# Patient Record
Sex: FEMALE | Race: WHITE | NOT HISPANIC OR LATINO | Employment: STUDENT | URBAN - METROPOLITAN AREA
[De-identification: names, ages, dates, MRNs, and addresses within clinical notes are randomized per-mention and may not be internally consistent; named-entity substitution may affect disease eponyms.]

---
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
## 2018-01-12 VITALS
SYSTOLIC BLOOD PRESSURE: 111 MMHG | WEIGHT: 111.5 LBS | HEART RATE: 57 BPM | BODY MASS INDEX: 20.39 KG/M2 | DIASTOLIC BLOOD PRESSURE: 69 MMHG

## 2018-01-15 VITALS
BODY MASS INDEX: 20.66 KG/M2 | HEART RATE: 68 BPM | DIASTOLIC BLOOD PRESSURE: 68 MMHG | SYSTOLIC BLOOD PRESSURE: 107 MMHG | HEIGHT: 62 IN | WEIGHT: 112.25 LBS

## 2018-05-10 ENCOUNTER — HOSPITAL ENCOUNTER (EMERGENCY)
Facility: HOSPITAL | Age: 18
Discharge: HOME | End: 2018-05-10
Admitting: EMERGENCY MEDICINE
Payer: COMMERCIAL

## 2018-05-10 VITALS
RESPIRATION RATE: 18 BRPM | TEMPERATURE: 98.7 F | DIASTOLIC BLOOD PRESSURE: 69 MMHG | HEART RATE: 84 BPM | OXYGEN SATURATION: 100 % | SYSTOLIC BLOOD PRESSURE: 114 MMHG | BODY MASS INDEX: 21.18 KG/M2 | WEIGHT: 115.8 LBS

## 2018-05-10 DIAGNOSIS — T78.40XA ALLERGIC REACTION, INITIAL ENCOUNTER: Primary | ICD-10-CM

## 2018-05-10 PROCEDURE — 96375 TX/PRO/DX INJ NEW DRUG ADDON: CPT

## 2018-05-10 PROCEDURE — 96374 THER/PROPH/DIAG INJ IV PUSH: CPT

## 2018-05-10 PROCEDURE — 99283 EMERGENCY DEPT VISIT LOW MDM: CPT

## 2018-05-10 PROCEDURE — 96372 THER/PROPH/DIAG INJ SC/IM: CPT

## 2018-05-10 RX ORDER — ONDANSETRON 2 MG/ML
4 INJECTION INTRAMUSCULAR; INTRAVENOUS ONCE
Status: COMPLETED | OUTPATIENT
Start: 2018-05-10 | End: 2018-05-10

## 2018-05-10 RX ORDER — METHYLPREDNISOLONE SODIUM SUCCINATE 125 MG/2ML
125 INJECTION, POWDER, LYOPHILIZED, FOR SOLUTION INTRAMUSCULAR; INTRAVENOUS ONCE
Status: COMPLETED | OUTPATIENT
Start: 2018-05-10 | End: 2018-05-10

## 2018-05-10 RX ORDER — DIPHENHYDRAMINE HYDROCHLORIDE 50 MG/ML
25 INJECTION INTRAMUSCULAR; INTRAVENOUS ONCE
Status: COMPLETED | OUTPATIENT
Start: 2018-05-10 | End: 2018-05-10

## 2018-05-10 RX ORDER — PREDNISONE 50 MG/1
50 TABLET ORAL DAILY
Qty: 4 TABLET | Refills: 0 | Status: SHIPPED | OUTPATIENT
Start: 2018-05-10 | End: 2018-05-14

## 2018-05-10 RX ORDER — CETIRIZINE HYDROCHLORIDE 10 MG/1
10 TABLET ORAL DAILY
Qty: 4 TABLET | Refills: 0 | Status: SHIPPED | OUTPATIENT
Start: 2018-05-10 | End: 2019-01-07 | Stop reason: CLARIF

## 2018-05-10 RX ORDER — EPINEPHRINE 1 MG/ML
0.3 INJECTION, SOLUTION, CONCENTRATE INTRAVENOUS ONCE
Status: COMPLETED | OUTPATIENT
Start: 2018-05-10 | End: 2018-05-10

## 2018-05-10 RX ADMIN — ONDANSETRON 4 MG: 2 INJECTION INTRAMUSCULAR; INTRAVENOUS at 18:16

## 2018-05-10 RX ADMIN — DIPHENHYDRAMINE HYDROCHLORIDE 25 MG: 50 INJECTION, SOLUTION INTRAMUSCULAR; INTRAVENOUS at 18:03

## 2018-05-10 RX ADMIN — METHYLPREDNISOLONE SODIUM SUCCINATE 125 MG: 125 INJECTION, POWDER, FOR SOLUTION INTRAMUSCULAR; INTRAVENOUS at 17:59

## 2018-05-10 RX ADMIN — EPINEPHRINE 0.3 MG: 1 INJECTION, SOLUTION, CONCENTRATE INTRAVENOUS at 17:54

## 2018-05-10 NOTE — ED PROVIDER NOTES
History  Chief Complaint   Patient presents with    Allergic Reaction     patient reports having 30 minutes ago peanut butter crackers x4  without knowing they were peanut butter and has known allergy  took 1 benadryl tablet upon arrival  airway patent at this time  reports "itchy throat and swollen mouth"  noted red face/rash  This [age]-year-old white female presents emergency room after accidentally eating crackers that have peanut butter in it she has a known peanut allergy  She does carry an EpiPen but has never used it  She did take 25 mg of Benadryl at home  She complains of facial flushing and hives  She also complains of a swelling of her oral mucosa as well as her throat feeling funny and sore when she swallows  She denies any respiratory difficulty  She denies any shortness of breath or wheezing or coughing  She does complain of nausea but no active vomiting  She denies abdominal pain or chest pain  Past medical history is negative  History provided by:  Patient  Allergic Reaction   Presenting symptoms: difficulty swallowing, itching, rash and swelling    Presenting symptoms: no difficulty breathing and no wheezing    Severity:  Moderate  Duration:  1 hour  Prior allergic episodes:  Food/nut allergies  Context: nuts    Context: not animal exposure, not chemicals, not cosmetics, not dairy/milk products, not eggs, not food allergies, not grass, not insect bite/sting, not jewelry/metal, not medications, not new detergents/soaps and not poison ivy    Relieved by:  Nothing  Ineffective treatments:  Antihistamines      None       History reviewed  No pertinent past medical history  History reviewed  No pertinent surgical history  History reviewed  No pertinent family history  I have reviewed and agree with the history as documented      Social History   Substance Use Topics    Smoking status: Never Smoker    Smokeless tobacco: Never Used    Alcohol use No        Review of Systems   Constitutional: Positive for appetite change and chills  Negative for activity change, diaphoresis and fatigue  HENT: Positive for facial swelling, sore throat and trouble swallowing  Negative for congestion, drooling, postnasal drip, rhinorrhea and sneezing  Eyes: Negative for pain, discharge, redness and itching  Respiratory: Negative for cough, chest tightness, shortness of breath and wheezing  Cardiovascular: Negative for chest pain, palpitations and leg swelling  Gastrointestinal: Positive for nausea  Negative for abdominal pain and vomiting  Endocrine: Negative for cold intolerance, heat intolerance, polydipsia, polyphagia and polyuria  Genitourinary: Negative for difficulty urinating, frequency, hematuria and urgency  Skin: Positive for color change, itching and rash  Hematological: Negative for adenopathy  Does not bruise/bleed easily  Psychiatric/Behavioral: Negative for confusion  All other systems reviewed and are negative  Physical Exam  ED Triage Vitals   Temperature Pulse Respirations Blood Pressure SpO2   05/10/18 1727 05/10/18 1726 05/10/18 1726 05/10/18 1726 05/10/18 1726   98 7 °F (37 1 °C) 93 18 114/69 100 %      Temp Source Heart Rate Source Patient Position - Orthostatic VS BP Location FiO2 (%)   05/10/18 1727 05/10/18 1726 05/10/18 1726 05/10/18 1726 --   Oral Monitor Sitting Left arm       Pain Score       05/10/18 1726       7           Orthostatic Vital Signs  Vitals:    05/10/18 1815 05/10/18 1830 05/10/18 1845 05/10/18 1900   BP:       Pulse: 78 88 88 84   Patient Position - Orthostatic VS:           Physical Exam   Constitutional: She is oriented to person, place, and time  She appears well-developed and well-nourished  No distress  HENT:   Head: Normocephalic     Right Ear: External ear normal    Left Ear: External ear normal    Nose: Nose normal    Mouth/Throat: Oropharynx is clear and moist    Eyes: Conjunctivae are normal  Right eye exhibits no discharge  Left eye exhibits no discharge  Neck: Neck supple  Cardiovascular: Normal rate, regular rhythm and normal heart sounds  Exam reveals no gallop and no friction rub  No murmur heard  Pulmonary/Chest: Effort normal and breath sounds normal  No respiratory distress  She has no wheezes  She has no rales  Abdominal: Soft  She exhibits no distension  There is no tenderness  There is no rebound and no guarding  Musculoskeletal: She exhibits no edema  Lymphadenopathy:     She has no cervical adenopathy  Neurological: She is alert and oriented to person, place, and time  Skin: Capillary refill takes less than 2 seconds  Rash noted  She is not diaphoretic  There is erythema  Facial flushing, scattered urticaria  Psychiatric: She has a normal mood and affect  Her behavior is normal  Judgment and thought content normal    Nursing note and vitals reviewed  ED Medications  Medications   EPINEPHrine PF (ADRENALIN) 1 mg/mL injection 0 3 mg (0 3 mg Intramuscular Given 5/10/18 1754)   methylPREDNISolone sodium succinate (Solu-MEDROL) injection 125 mg (125 mg Intravenous Given 5/10/18 1759)   diphenhydrAMINE (BENADRYL) injection 25 mg (25 mg Intravenous Given 5/10/18 1803)   ondansetron (ZOFRAN) injection 4 mg (4 mg Intravenous Given 5/10/18 1816)       Diagnostic Studies  Results Reviewed     None                 No orders to display              Procedures  Procedures       Phone Contacts  ED Phone Contact    ED Course  ED Course as of May 10 2322   Thu May 10, 2018   1810 Fidelina 73 is feeling better after the epinephrine injection, Benadryl and Solumedrol  Will observe for one hour and then release home with her Father  1900 Patient is asymptomatic  Plan discharge to home                                  MDM  Number of Diagnoses or Management Options  Allergic reaction, initial encounter: new and does not require workup  Risk of Complications, Morbidity, and/or Mortality  Presenting problems: high  Diagnostic procedures: high  Management options: high  General comments: Patient presents to the emergency room after accidentally eating crackers that contained peanuts  She complained of facial swelling and swelling within her oral mucosa  She was complaining of difficulty swallowing  She denied any airway issues  She was not having any difficulty breathing  She was seen and evaluated  She was given a dose of epinephrine as well as IV Solu-Medrol and Benadryl  She was observed for 1 5  hours  She was feeling better at discharge  She was instructed to return if her symptoms worsen  She was given prescriptions for prednisone to continue for 4 more days as well as Zyrtec and Benadryl for breakthrough itching  I instructed her to use her EpiPen at home the next time she has an exposure to peanuts  Patient Progress  Patient progress: stable    CritCare Time    Disposition  Final diagnoses: Allergic reaction, initial encounter     Time reflects when diagnosis was documented in both MDM as applicable and the Disposition within this note     Time User Action Codes Description Comment    5/10/2018  7:02 PM Esmer Heller [[id number]] Allergic reaction, initial encounter       ED Disposition     ED Disposition Condition Comment    Discharge  Jaqueline Moyer discharge to home/self care      Condition at discharge: Good        Follow-up Information     Follow up With Specialties Details Why [facility], DO Pediatrics  As needed [address] [place] [address]  [phone]          Discharge Medication List as of 5/10/2018  7:05 PM      START taking these medications    Details   cetirizine (ZyrTEC) 10 mg tablet Take 1 tablet (10 mg total) by mouth daily for 4 doses, Starting Thu 5/10/2018, Until Mon 5/14/2018, Normal      predniSONE 50 mg tablet Take 1 tablet (50 mg total) by mouth daily for 4 days Start prednisone on Friday, 05/11/2018, Starting u 5/10/2018, Until Mon 5/14/2018, Normal           No discharge procedures on file      ED Provider  Electronically Signed by           Keyur Vargas PA-C  05/10/18 5570

## 2018-05-10 NOTE — ED NOTES
PT awake and alert, no distress noted, no other questions upon d/c       April Rosa Isela Ovalle RN  05/10/18 8761

## 2018-05-10 NOTE — DISCHARGE INSTRUCTIONS
Food Allergy   WHAT YOU NEED TO KNOW:   A food allergy is an immune system reaction to a food  A food allergen is an ingredient or chemical in a food that causes your immune system to react  Allergic reactions happen when your immune system fights too strongly against an allergen and causes you to get sick  Allergic reactions can happen within minutes to several hours after you eat, touch, or smell the food  You can also have a second reaction up to 8 hours later  DISCHARGE INSTRUCTIONS:   Call 911 for signs or symptoms of anaphylaxis,  such as trouble breathing, swelling in your mouth or throat, or wheezing  You may also have itching, a rash, hives, or feel like you are going to faint  Return to the emergency department if:   · Your mouth, tongue, or throat swells  · You have itching or hives that spread all over your body  Contact your healthcare provider if:   · You have new or worsening rashes, hives, or itching  · You have an upset stomach or are vomiting  · You have stomach cramps or diarrhea  · You have questions about your treatment, medicine, or care  Medicines:   · Epinephrine  is used to treat severe allergic reactions such as anaphylaxis  · Antihistamines  decrease mild symptoms such as itching or a rash  · Steroids: This medicine may be given to decrease inflammation  · Short-acting bronchodilators: You may need short-acting bronchodilators to help open your airways quickly  These medicines may be called rescue inhalers or relievers  They relieve sudden, severe symptoms and start to work right away  · Take your medicine as directed  Contact your healthcare provider if you think your medicine is not helping or if you have side effects  Tell him or her if you are allergic to any medicine  Keep a list of the medicines, vitamins, and herbs you take  Include the amounts, and when and why you take them  Bring the list or the pill bottles to follow-up visits   Carry your medicine list with you in case of an emergency  Follow up with your healthcare provider as directed: You may need to see specialists for ongoing care  Your healthcare provider may want to test you regularly to see if the food allergy changes  Write down your questions so you remember to ask them during follow-up visits  Steps to take for signs or symptoms of anaphylaxis:   · Immediately  give 1 shot of epinephrine only into the outer thigh muscle  · Leave the shot in place  as directed  Your healthcare provider may recommend you leave it in place for up to 10 seconds before you remove it  This helps make sure all of the epinephrine is delivered  · Call 911 and go to the emergency department,  even if the shot improved symptoms  Do not drive yourself  Bring the used epinephrine shot with you  Safety precautions to take if you are at risk for anaphylaxis:   · Keep 2 shots of epinephrine with you at all times  You may need a second shot, because epinephrine only works for about 20 minutes and symptoms may return  Your healthcare provider can show you and family members how to give the shot  Check the expiration date every month and replace it before it expires  · Create an action plan  Your healthcare provider can help you create a written plan that explains the allergy and an emergency plan to treat a reaction  The plan explains when to give a second epinephrine shot if symptoms return or do not improve after the first  Give copies of the action plan and emergency instructions to family members, work and school staff, and  providers  Show them how to give a shot of epinephrine  Update the plan as the allergy changes  · Be careful when you exercise  If you have had exercise-induced anaphylaxis, do not exercise right after you eat  Stop exercising right away if you start to develop any signs or symptoms of anaphylaxis  You may first feel tired, warm, or have itchy skin   Hives, swelling, and severe breathing problems may develop if you continue to exercise  · Carry medical alert identification  Wear jewelry or carry a card that says you have a food allergy  Ask your healthcare provider where to get these items  · Do not eat the food that causes your allergy  Even a small taste can cause an allergic reaction  Your healthcare provider or a dietitian can help you plan a balanced diet  Babies may need to drink a formula that does not contain milk or soy  A dietitian can teach you how to read labels for ingredients that cause your allergies  · Ask about ingredients in foods prepared outside your home  When you eat out, ask what is in the food you want to order  Ask how food is prepared  Fried foods may contain small amounts of food allergens, such as nuts and shellfish  · Use good hygiene  Do not share utensils or food  Wash your hands before and after meals  Flu vaccine and egg allergy:  Do not get the nasal spray form of the flu vaccine if you have an egg allergy  The nasal spray may contain egg proteins that can cause anaphylaxis  Ask your healthcare provider if the injection form of the vaccine is safe for you  © 2017 [facility] Information is for End User's use only and may not be sold, redistributed or otherwise used for commercial purposes  All illustrations and images included in CareNotes® are the copyrighted property of [employer]  or Miller Cobb  The above information is an  only  It is not intended as medical advice for individual conditions or treatments  Talk to your doctor, nurse or pharmacist before following any medical regimen to see if it is safe and effective for you  General Allergic Reaction, Ambulatory Care   GENERAL INFORMATION:   A general allergic reaction  is your body's response to an allergen  Allergens include medicines, food, insect stings, animal dander, mold, latex, chemicals, and dust mites   Pollen from trees, grass, and weeds can also cause an allergic reaction  An allergic reaction can cause one or more symptoms  Seek immediate care for the following symptoms:   · A skin rash, hives, swelling, or itching that gets worse    · Trouble breathing, shortness of breath, wheezing, or coughing    · Tightened or swollen throat, or your lips or tongue swell    · Trouble swallowing or speaking    · Dizziness, lightheadedness, fainting, or confusion    · Nausea, vomiting, diarrhea, or abdominal cramps    · Chest pain or tightness  Treatment for a general allergic reaction  may include medicines to relieve certain allergy symptoms such as itching, sneezing, and swelling  You may take them as a pill or use drops in your nose or eyes  Topical treatments may be given to put directly on your skin to help decrease itching or swelling  Manage allergic reactions:   · Avoid the allergen  that you think may have caused your allergic reaction  · Use cold compresses  on your skin or eyes if they were affected by the allergic reaction  Cold compresses may help to soothe your skin or eyes  · Rinse your nasal passages  with a saline solution  Daily rinsing may help clear your nose of allergens  · Do not smoke  Your allergy symptoms may decrease if you are not around smoke  If you smoke, it is never too late to quit  Ask your healthcare provider for information about how to stop if you need help quitting  Follow up with your healthcare provider as directed:  Write down your questions so you remember to ask them during your visits  CARE AGREEMENT:   You have the right to help plan your care  Learn about your health condition and how it may be treated  Discuss treatment options with your caregivers to decide what care you want to receive  You always have the right to refuse treatment  The above information is an  only  It is not intended as medical advice for individual conditions or treatments   Talk to your doctor, nurse or pharmacist before following any medical regimen to see if it is safe and effective for you  © 2014 [address] is for End User's use only and may not be sold, redistributed or otherwise used for commercial purposes  All illustrations and images included in CareNotes® are the copyrighted property of A D A M [employer] Inc  or Miller Cobb

## 2018-11-20 ENCOUNTER — OFFICE VISIT (OUTPATIENT)
Dept: OBGYN CLINIC | Facility: CLINIC | Age: 18
End: 2018-11-20

## 2018-11-20 VITALS
HEART RATE: 70 BPM | OXYGEN SATURATION: 100 % | WEIGHT: 115.4 LBS | SYSTOLIC BLOOD PRESSURE: 116 MMHG | BODY MASS INDEX: 20.45 KG/M2 | DIASTOLIC BLOOD PRESSURE: 80 MMHG | HEIGHT: 63 IN

## 2018-11-20 DIAGNOSIS — N76.0 ACUTE VAGINITIS: Primary | ICD-10-CM

## 2018-11-20 DIAGNOSIS — D22.9 NEVUS: ICD-10-CM

## 2018-11-20 LAB — SL AMB POCT WET MOUNT: ABNORMAL

## 2018-11-20 PROCEDURE — 99203 OFFICE O/P NEW LOW 30 MIN: CPT | Performed by: NURSE PRACTITIONER

## 2018-11-20 PROCEDURE — 87210 SMEAR WET MOUNT SALINE/INK: CPT | Performed by: NURSE PRACTITIONER

## 2018-11-20 RX ORDER — FLUCONAZOLE 150 MG/1
150 TABLET ORAL ONCE
Qty: 2 TABLET | Refills: 0 | Status: SHIPPED | OUTPATIENT
Start: 2018-11-20 | End: 2018-11-20

## 2018-11-20 RX ORDER — LEVONORGESTREL AND ETHINYL ESTRADIOL 0.1-0.02MG
1 KIT ORAL DAILY
COMMUNITY

## 2018-11-20 RX ORDER — ALBUTEROL SULFATE 90 UG/1
AEROSOL, METERED RESPIRATORY (INHALATION)
COMMUNITY

## 2018-12-28 ENCOUNTER — TELEPHONE (OUTPATIENT)
Dept: OBGYN CLINIC | Facility: CLINIC | Age: 18
End: 2018-12-28

## 2019-01-07 ENCOUNTER — OFFICE VISIT (OUTPATIENT)
Dept: OBGYN CLINIC | Facility: CLINIC | Age: 19
End: 2019-01-07

## 2019-01-07 VITALS — SYSTOLIC BLOOD PRESSURE: 122 MMHG | BODY MASS INDEX: 20.53 KG/M2 | WEIGHT: 115 LBS | DIASTOLIC BLOOD PRESSURE: 60 MMHG

## 2019-01-07 DIAGNOSIS — N76.1 CHRONIC VAGINITIS: Primary | ICD-10-CM

## 2019-01-07 PROCEDURE — 87480 CANDIDA DNA DIR PROBE: CPT | Performed by: NURSE PRACTITIONER

## 2019-01-07 PROCEDURE — 99213 OFFICE O/P EST LOW 20 MIN: CPT | Performed by: NURSE PRACTITIONER

## 2019-01-07 PROCEDURE — 87510 GARDNER VAG DNA DIR PROBE: CPT | Performed by: NURSE PRACTITIONER

## 2019-01-07 PROCEDURE — 87660 TRICHOMONAS VAGIN DIR PROBE: CPT | Performed by: NURSE PRACTITIONER

## 2019-01-07 RX ORDER — AMOXICILLIN 500 MG/1
500 CAPSULE ORAL 3 TIMES DAILY
Refills: 0 | COMMUNITY
Start: 2019-01-02

## 2019-01-07 RX ORDER — INFLUENZA A VIRUS A/SINGAPORE/GP1908/2015 IVR-180A (H1N1) ANTIGEN (PROPIOLACTONE INACTIVATED), INFLUENZA A VIRUS A/HONG KONG/4801/2014 X-263B (H3N2) ANTIGEN (PROPIOLACTONE INACTIVATED), INFLUENZA B VIRUS B/BRISBANE/46/2015 ANTIGEN (PROPIOLACTONE INACTIVATED), AND INFLUENZA B VIRUS B/PHUKET/3073/2013 BVR-1B ANTIGEN (PROPIOLACTONE INACTIVATED) 15; 15; 15; 15 UG/.5ML; UG/.5ML; UG/.5ML; UG/.5ML
INJECTION, SUSPENSION INTRAMUSCULAR
Refills: 0 | COMMUNITY
Start: 2018-11-21 | End: 2019-01-07

## 2019-01-07 NOTE — PROGRESS NOTES
Assessment/Plan:    1  Chronic vaginitis  Unresponsive to fluconazole  Did not use topical -- advised to do so  Affirm taken, will treat accordingly    Subjective:      Patient ID: Anuel Mack is a 19 y o  female  PROBLEM VISIT   HPI    Continues to have vulvovaginal symptoms  She was treated for a yeast infection 11/20/18, took two fluconazole but is still with symptoms  Stinging, prickly discomfort intravaginally; when discharge comes out creates itching  She did not use the topical triamcinolone ointment  Girard teeth just extracted- started antibiotic on 1/2/19 3x daily for 10 days  Started her period today  The following portions of the patient's history were reviewed and updated as appropriate: allergies, current medications, past family history, past medical history, past social history, past surgical history and problem list     Review of Systems   Constitutional: Negative for chills and fever  Genitourinary: Positive for vaginal discharge  Negative for dyspareunia, dysuria, frequency, genital sores, hematuria, menstrual problem, pelvic pain, urgency, vaginal bleeding and vaginal pain  Recent antibiotic treatment: yes, she is on 5th day of 10 day course    Stinging, prickly; itches when discharge comes out         Objective:    /60 (BP Location: Left arm, Patient Position: Sitting)   Wt 52 2 kg (115 lb)   LMP 01/07/2018 (Exact Date)   Breastfeeding? No   BMI 20 53 kg/m²        Physical Exam   Constitutional: She appears well-developed and well-nourished  She appears distressed  HENT:   Head: Normocephalic and atraumatic  Eyes: Pupils are equal, round, and reactive to light  Pulmonary/Chest: Effort normal    Genitourinary:       There is no rash, tenderness, lesion or injury on the right labia  There is no rash, tenderness, lesion or injury on the left labia  Uterus is not enlarged and not tender   Cervix exhibits no motion tenderness, no discharge and no friability  Right adnexum displays no mass and no tenderness  Left adnexum displays no mass and no tenderness  There is bleeding (scant, menstrual) in the vagina  No erythema or tenderness in the vagina  No foreign body in the vagina  No signs of injury around the vagina  No vaginal discharge found  Lymphadenopathy:        Right: No inguinal adenopathy present  Left: No inguinal adenopathy present  Neurological: She is alert  Psychiatric: She has a normal mood and affect   Her behavior is normal  Thought content normal

## 2019-01-09 LAB
CANDIDA RRNA VAG QL PROBE: NEGATIVE
G VAGINALIS RRNA GENITAL QL PROBE: NEGATIVE
T VAGINALIS RRNA GENITAL QL PROBE: NEGATIVE

## 2019-01-14 ENCOUNTER — TELEPHONE (OUTPATIENT)
Dept: OBGYN CLINIC | Facility: CLINIC | Age: 19
End: 2019-01-14

## 2022-04-01 NOTE — PROGRESS NOTES
Assessment/Plan:    1  Acute vaginitis  Wet mount positive for buds  rx sent for fluconazole and external triamcinolone  Discussed preventative measures  - fluconazole (DIFLUCAN) 150 mg tablet; Take 1 tablet (150 mg total) by mouth once for 1 dose Re[eat 2nd dose in 3 days if needed  Dispense: 2 tablet; Refill: 0  - triamcinolone (KENALOG) 0 1 % ointment; Apply topically 2 (two) times a day for 7 days  Dispense: 30 g; Refill: 0  - POCT wet mount    2  Nevus  Dark nevus with irregular borders left labia majorum  Recommend removal with Dr Anay Taylor  Patient agrees  I have spent 30 minutes with Patient  today in which greater than 50% of this time was spent in counseling/coordination of care regarding Diagnostic results, Intructions for management, Patient and family education and Impressions  Subjective:      Patient ID: Damien Cobb is a 19 y o  female  HPI  NEW PATIENT/ PROBLEM  CC: vaginitis    Patient presents with complaint of what she believes to be reoccurring vaginal yeast infections  States that over the summer she had a "typical yeast infection"  She used antibiotic cream externally and monistat with effect  Upon return to college in September she developed symptoms again-- discomfort, itching, clumpy discharge  She used monistat again which resolved the symptoms  Symptoms reoccurred in early October and early this month  Used monistat again, but this time it did not resolve her symptoms  Now very intense sudden itching, stinging  No increase in VD, no foul odor  Was having some BTB-- started [facility] 9/27  No pelvic pain or urinary symptoms      Received gardasil vaccine  BC: OCP-- just started end of September  Monogamous, + condom use  Had STD screening at [facility] 9/2018    The following portions of the patient's history were reviewed and updated as appropriate: allergies, current medications, past family history, past medical history, past social history, past surgical history and problem list     Review of Systems   Constitutional: Negative for chills and fever  Genitourinary: Positive for vaginal discharge (w/ external itching)  Negative for dyspareunia, dysuria, frequency, genital sores, hematuria, menstrual problem, pelvic pain, urgency, vaginal bleeding and vaginal pain  Objective:    /80 (BP Location: Left arm, Patient Position: Sitting, Cuff Size: Standard)   Pulse 70   Ht 5' 2 75" (1 594 m)   Wt 52 3 kg (115 lb 6 4 oz)   LMP 11/08/2018 (Within Days)   SpO2 100%   BMI 20 61 kg/m²     WET MOUNT: neg clue cells, neg hyphae, positive buds, neg trichomonads, negative amine  Physical Exam   Constitutional: She appears well-developed  Genitourinary:       There is no rash, tenderness, lesion or injury on the right labia  There is no rash, tenderness, lesion or injury on the left labia  Uterus is not enlarged and not tender  Cervix exhibits no motion tenderness, no discharge and no friability  Right adnexum displays no mass and no tenderness  Left adnexum displays no mass and no tenderness  No erythema, tenderness or bleeding in the vagina  No foreign body in the vagina  No signs of injury around the vagina  Vaginal discharge (no odor) found  Lymphadenopathy:        Right: No inguinal adenopathy present  Left: No inguinal adenopathy present  cane